# Patient Record
Sex: MALE | Race: WHITE
[De-identification: names, ages, dates, MRNs, and addresses within clinical notes are randomized per-mention and may not be internally consistent; named-entity substitution may affect disease eponyms.]

---

## 2018-03-23 ENCOUNTER — HOSPITAL ENCOUNTER (EMERGENCY)
Dept: HOSPITAL 63 - ER | Age: 38
Discharge: HOME | End: 2018-03-23
Payer: SELF-PAY

## 2018-03-23 VITALS — BODY MASS INDEX: 24.78 KG/M2 | HEIGHT: 73 IN | WEIGHT: 187 LBS

## 2018-03-23 VITALS — DIASTOLIC BLOOD PRESSURE: 64 MMHG | SYSTOLIC BLOOD PRESSURE: 122 MMHG

## 2018-03-23 DIAGNOSIS — S39.012A: ICD-10-CM

## 2018-03-23 DIAGNOSIS — F17.210: ICD-10-CM
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
## 2018-03-23 DIAGNOSIS — S16.1XXA: Primary | ICD-10-CM
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
## 2018-03-23 DIAGNOSIS — G89.29: ICD-10-CM

## 2018-03-23 PROCEDURE — 99283 EMERGENCY DEPT VISIT LOW MDM: CPT
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
## 2018-05-19 ENCOUNTER — HOSPITAL ENCOUNTER (EMERGENCY)
Dept: HOSPITAL 61 - ER | Age: 38
Discharge: HOME | End: 2018-05-19
Payer: SELF-PAY

## 2018-05-19 DIAGNOSIS — S62.310A: Primary | ICD-10-CM
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
## 2018-05-19 PROCEDURE — 29125 APPL SHORT ARM SPLINT STATIC: CPT

## 2018-05-19 PROCEDURE — 99284 EMERGENCY DEPT VISIT MOD MDM: CPT

## 2018-05-19 PROCEDURE — 73130 X-RAY EXAM OF HAND: CPT

## 2018-05-19 RX ADMIN — NAPROXEN 1 MG: 500 TABLET ORAL at 21:55

## 2018-05-19 RX ADMIN — HYDROCODONE BITARTRATE AND ACETAMINOPHEN 1 TAB: 5; 325 TABLET ORAL at 21:55

## 2018-05-22 ENCOUNTER — HOSPITAL ENCOUNTER (EMERGENCY)
Dept: HOSPITAL 63 - ER | Age: 38
Discharge: HOME | End: 2018-05-22
Payer: COMMERCIAL

## 2018-05-22 VITALS
SYSTOLIC BLOOD PRESSURE: 119 MMHG | SYSTOLIC BLOOD PRESSURE: 119 MMHG | DIASTOLIC BLOOD PRESSURE: 70 MMHG | DIASTOLIC BLOOD PRESSURE: 70 MMHG | SYSTOLIC BLOOD PRESSURE: 119 MMHG | DIASTOLIC BLOOD PRESSURE: 70 MMHG

## 2018-05-22 VITALS — WEIGHT: 182.54 LBS | BODY MASS INDEX: 24.19 KG/M2 | HEIGHT: 73 IN

## 2018-05-22 DIAGNOSIS — R07.81: ICD-10-CM

## 2018-05-22 DIAGNOSIS — G89.29: ICD-10-CM

## 2018-05-22 DIAGNOSIS — F17.210: ICD-10-CM

## 2018-05-22 DIAGNOSIS — R56.9: Primary | ICD-10-CM

## 2018-05-22 LAB
AMPHETAMINE/METHAMPHETAMINE: (no result)
ANION GAP SERPL CALC-SCNC: 13 MMOL/L (ref 6–14)
APTT PPP: (no result) S
BACTERIA #/AREA URNS HPF: 0 /HPF
BARBITURATES UR-MCNC: (no result) UG/ML
BASOPHILS # BLD AUTO: 0.1 X10^3/UL (ref 0–0.2)
BASOPHILS NFR BLD: 1 % (ref 0–3)
BENZODIAZ UR-MCNC: (no result) UG/L
BILIRUB UR QL STRIP: (no result)
CA-I SERPL ISE-MCNC: 11 MG/DL (ref 8–26)
CALCIUM SERPL-MCNC: 8.7 MG/DL (ref 8.5–10.1)
CANNABINOIDS UR-MCNC: (no result) UG/L
CHLORIDE SERPL-SCNC: 101 MMOL/L (ref 98–107)
CO2 SERPL-SCNC: 25 MMOL/L (ref 21–32)
COCAINE UR-MCNC: (no result) NG/ML
CREAT SERPL-MCNC: 1 MG/DL (ref 0.7–1.3)
EOSINOPHIL NFR BLD: 0 % (ref 0–3)
EOSINOPHIL NFR BLD: 0 X10^3/UL (ref 0–0.7)
ERYTHROCYTE [DISTWIDTH] IN BLOOD BY AUTOMATED COUNT: 13.1 % (ref 11.5–14.5)
FIBRINOGEN PPP-MCNC: CLEAR MG/DL
GFR SERPLBLD BASED ON 1.73 SQ M-ARVRAT: 84.5 ML/MIN
GLUCOSE SERPL-MCNC: 114 MG/DL (ref 70–99)
GLUCOSE UR STRIP-MCNC: (no result) MG/DL
HCT VFR BLD CALC: 39.5 % (ref 39–53)
HGB BLD-MCNC: 14.2 G/DL (ref 13–17.5)
LYMPHOCYTES # BLD: 0.9 X10^3/UL (ref 1–4.8)
LYMPHOCYTES NFR BLD AUTO: 14 % (ref 24–48)
MCH RBC QN AUTO: 31 PG (ref 25–35)
MCHC RBC AUTO-ENTMCNC: 36 G/DL (ref 31–37)
MCV RBC AUTO: 85 FL (ref 79–100)
METHADONE SERPL-MCNC: (no result) NG/ML
MONO #: 0.8 X10^3/UL (ref 0–1.1)
MONOCYTES NFR BLD: 11 % (ref 0–9)
NEUT #: 5.2 X10^3UL (ref 1.8–7.7)
NEUTROPHILS NFR BLD AUTO: 74 % (ref 31–73)
NITRITE UR QL STRIP: (no result)
OPIATES UR-MCNC: (no result) NG/ML
PCP SERPL-MCNC: (no result) MG/DL
PLATELET # BLD AUTO: 261 X10^3/UL (ref 140–400)
POTASSIUM SERPL-SCNC: 3.2 MMOL/L (ref 3.5–5.1)
RBC # BLD AUTO: 4.66 X10^6/UL (ref 4.3–5.7)
RBC #/AREA URNS HPF: (no result) /HPF (ref 0–2)
SODIUM SERPL-SCNC: 139 MMOL/L (ref 136–145)
SP GR UR STRIP: 1.01
SQUAMOUS #/AREA URNS LPF: (no result) /LPF
UROBILINOGEN UR-MCNC: 4 MG/DL
WBC # BLD AUTO: 7 X10^3/UL (ref 4–11)
WBC #/AREA URNS HPF: 0 /HPF (ref 0–4)

## 2018-05-22 PROCEDURE — 85025 COMPLETE CBC W/AUTO DIFF WBC: CPT

## 2018-05-22 PROCEDURE — 99285 EMERGENCY DEPT VISIT HI MDM: CPT

## 2018-05-22 PROCEDURE — 80307 DRUG TEST PRSMV CHEM ANLYZR: CPT

## 2018-05-22 PROCEDURE — 96374 THER/PROPH/DIAG INJ IV PUSH: CPT

## 2018-05-22 PROCEDURE — 81001 URINALYSIS AUTO W/SCOPE: CPT

## 2018-05-22 PROCEDURE — 71100 X-RAY EXAM RIBS UNI 2 VIEWS: CPT

## 2018-05-22 PROCEDURE — 36415 COLL VENOUS BLD VENIPUNCTURE: CPT

## 2018-05-22 PROCEDURE — 93005 ELECTROCARDIOGRAM TRACING: CPT

## 2018-05-22 PROCEDURE — 80048 BASIC METABOLIC PNL TOTAL CA: CPT

## 2018-05-22 PROCEDURE — G0479 DRUG TEST PRESUMP NOT OPT: HCPCS

## 2018-05-22 PROCEDURE — 80177 DRUG SCRN QUAN LEVETIRACETAM: CPT

## 2018-05-22 PROCEDURE — 70450 CT HEAD/BRAIN W/O DYE: CPT

## 2018-05-22 NOTE — RAD
CT head without contrast

 

TECHNIQUE: 5 mm axial noncontrast CT imaging skull base to vertex.

 

HISTORY: Fall, seizure.

 

FINDINGS: No intracranial hemorrhage, mass, hydrocephalus, extra-axial 

fluid collections or infarction. No acute ischemic changes evident. 

Partial opacification ethmoid sinuses. Orbits, mastoids and bones are 

unremarkable.

 

IMPRESSION: No acute intracranial CT abnormality.

 

 

 

 

 

Exposure: One or more of the following individualized dose reduction 

techniques were utilized for this examination:  

1. Automated exposure control  

2. Adjustment of the mA and/or kV according to patient size  

3. Use of iterative reconstruction technique

 

Electronically signed by: Sp Sow MD (5/22/2018 6:59 AM) 

[email or web address]
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
## 2018-05-22 NOTE — EKG
[facility] [address], [place] [address]

Test Date:    2018               Test Time:    07:09:13

Pat Name:     ALICIA ABEL              Department:   

Patient ID:   [id number]           Room:          

Gender:       M                        Technician:   

:          [id number]               Requested By: PRISCILLA PERRY

Order Number: [id number]            Reading MD:     

                                 Measurements

Intervals                              Axis          

Rate:         96                       P:            66

FL:           152                      QRS:          42

QRSD:         100                      T:            28

QT:           358                                    

QTc:          453                                    

                           Interpretive Statements

SINUS RHYTHM

QRS(T) CONTOUR ABNORMALITY

CONSIDER ANTEROSEPTAL MYOCARDIAL DAMAGE

CONSIDER INFERIOR MYOCARDIAL DAMAGE

POSSIBLY ABNORMAL ECG

RI6.01

No previous ECG available for comparison

## 2018-05-22 NOTE — RAD
AP chest and right rib radiographs, 5 total images

 

History:  Lateral right rib pain, seizure history.

 

Comparison:  None.

 

Findings: Cardiomediastinal silhouette is within normal limits for size.  

Bilateral lung fields appear clear without evidence of infiltrate, 

effusion, or pneumothorax.  No displaced rib fractures are identified.

 

Impression: 

1.  No acute abnormality identified in the chest.

 

Electronically signed by: Rodney Loepz MD (5/22/2018 7:39 AM) [facility]

## 2020-09-18 ENCOUNTER — HOSPITAL ENCOUNTER (OUTPATIENT)
Dept: HOSPITAL 63 - DXRAD | Age: 40
Discharge: HOME | End: 2020-09-18
Attending: PHYSICIAN ASSISTANT
Payer: COMMERCIAL
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
## 2020-09-18 DIAGNOSIS — S62.652A: Primary | ICD-10-CM
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
## 2020-09-18 PROCEDURE — 73140 X-RAY EXAM OF FINGER(S): CPT

## 2020-09-18 NOTE — RAD
EXAMINATION: FINGER(S) RIGHT  

 

CLINICAL HISTORY: Right finger pain following injury

 

TECHNIQUE: FINGER(S) RIGHT

   Number of Images/Views: 3

 

COMPARISON: Right hand radiographs 5/19/2018

 

FINDINGS:  

 

Healing subacute nondisplaced oblique fracture through the third middle 

phalanx with intra-articular extension to the DIP joint. Mild surrounding 

periosteal reaction with persistent radiolucent fracture plane. Remote 

posttraumatic changes in the fourth proximal phalanx and fifth metacarpal,

similar to prior study. Joint spaces and alignment otherwise maintained. 

Mild soft tissue swelling in the long finger.

 

 

IMPRESSION:  

 

Healing nondisplaced third middle phalanx intra-articular fracture.

 

 

Electronically signed by: Mauri Dove DO (9/18/2020 9:31 AM) [id number]

## 2020-10-30 ENCOUNTER — HOSPITAL ENCOUNTER (OUTPATIENT)
Dept: HOSPITAL 63 - DXRAD | Age: 40
End: 2020-10-30
Attending: PHYSICIAN ASSISTANT
Payer: COMMERCIAL
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
## 2020-10-30 DIAGNOSIS — S61.302A: ICD-10-CM

## 2020-10-30 DIAGNOSIS — S62.652A: Primary | ICD-10-CM

## 2020-10-30 PROCEDURE — 73140 X-RAY EXAM OF FINGER(S): CPT

## 2020-10-30 NOTE — RAD
Three views right third finger:

 

Clinical History:  Reason: RIGHT 3RD FINGER INJURY / Spl. Instructions:  /

History: .

 

Technique:  AP view of the hand, as well as lateral and oblique collimated

views of the middle finger were obtained.

 

Comparison: None.

 

Findings:

 

There is an oblique minimally displaced fractures of the middle phalanx of

the middle finger. There is a avulsion of the distal lateral corner of the

middle phalanx. There is an exostosis arising from the medial cortex of 

the distal aspect of the proximal phalanx of the fourth finger.

 

Impression:  

 

1. Oblique minimally displaced fracture through the mid and distal aspect 

of the middle phalanx of the middle finger.

2. Avulsion injury to the distal lateral corner of the middle phalanx of 

the middle finger.

3. Benign exostosis arising from the lateral distal aspect of the proximal

phalanx of the fourth finger.

 

Electronically signed by: Josiah Avila III, MD (10/30/2020 1:17 PM) 

[facility]